# Patient Record
Sex: MALE | Race: WHITE | Employment: FULL TIME | URBAN - METROPOLITAN AREA
[De-identification: names, ages, dates, MRNs, and addresses within clinical notes are randomized per-mention and may not be internally consistent; named-entity substitution may affect disease eponyms.]

---

## 2020-01-16 ENCOUNTER — HOSPITAL ENCOUNTER (EMERGENCY)
Facility: CLINIC | Age: 40
Discharge: HOME | End: 2020-01-16
Attending: EMERGENCY MEDICINE | Admitting: EMERGENCY MEDICINE
Payer: COMMERCIAL

## 2020-01-16 ENCOUNTER — HOSPITAL ENCOUNTER (EMERGENCY)
Facility: CLINIC | Age: 40
Discharge: HOME | End: 2020-01-17
Attending: EMERGENCY MEDICINE | Admitting: EMERGENCY MEDICINE
Payer: COMMERCIAL

## 2020-01-16 VITALS
RESPIRATION RATE: 20 BRPM | SYSTOLIC BLOOD PRESSURE: 142 MMHG | TEMPERATURE: 96 F | DIASTOLIC BLOOD PRESSURE: 85 MMHG | BODY MASS INDEX: 30.39 KG/M2 | HEIGHT: 64 IN | HEART RATE: 80 BPM | OXYGEN SATURATION: 98 % | WEIGHT: 178 LBS

## 2020-01-16 VITALS
TEMPERATURE: 98.2 F | HEIGHT: 64 IN | BODY MASS INDEX: 30.39 KG/M2 | OXYGEN SATURATION: 99 % | WEIGHT: 178 LBS | DIASTOLIC BLOOD PRESSURE: 98 MMHG | SYSTOLIC BLOOD PRESSURE: 148 MMHG | RESPIRATION RATE: 20 BRPM

## 2020-01-16 DIAGNOSIS — R04.0 EPISTAXIS: ICD-10-CM

## 2020-01-16 DIAGNOSIS — F41.0 ANXIETY ATTACK: ICD-10-CM

## 2020-01-16 PROCEDURE — 30903 CONTROL OF NOSEBLEED: CPT | Mod: RT

## 2020-01-16 PROCEDURE — 25000128 H RX IP 250 OP 636: Performed by: EMERGENCY MEDICINE

## 2020-01-16 PROCEDURE — 27210182 ZZH KIT NASAL PACKING

## 2020-01-16 PROCEDURE — 99285 EMERGENCY DEPT VISIT HI MDM: CPT | Mod: 25,27

## 2020-01-16 PROCEDURE — 96372 THER/PROPH/DIAG INJ SC/IM: CPT

## 2020-01-16 PROCEDURE — 25000132 ZZH RX MED GY IP 250 OP 250 PS 637: Performed by: EMERGENCY MEDICINE

## 2020-01-16 PROCEDURE — 99284 EMERGENCY DEPT VISIT MOD MDM: CPT | Mod: 25

## 2020-01-16 RX ORDER — HYDROXYZINE HYDROCHLORIDE 25 MG/1
25 TABLET, FILM COATED ORAL ONCE
Status: COMPLETED | OUTPATIENT
Start: 2020-01-16 | End: 2020-01-16

## 2020-01-16 RX ORDER — LORAZEPAM 1 MG/1
1 TABLET ORAL ONCE
Status: COMPLETED | OUTPATIENT
Start: 2020-01-16 | End: 2020-01-16

## 2020-01-16 RX ORDER — TRANEXAMIC ACID 100 MG/ML
500 INJECTION, SOLUTION INTRAVENOUS ONCE
Status: DISCONTINUED | OUTPATIENT
Start: 2020-01-16 | End: 2020-01-16 | Stop reason: HOSPADM

## 2020-01-16 RX ORDER — ACETAMINOPHEN 500 MG
1000 TABLET ORAL ONCE
Status: COMPLETED | OUTPATIENT
Start: 2020-01-16 | End: 2020-01-16

## 2020-01-16 RX ORDER — LORAZEPAM 2 MG/ML
1 INJECTION INTRAMUSCULAR ONCE
Status: COMPLETED | OUTPATIENT
Start: 2020-01-16 | End: 2020-01-16

## 2020-01-16 RX ADMIN — ACETAMINOPHEN 1000 MG: 500 TABLET, FILM COATED ORAL at 23:42

## 2020-01-16 RX ADMIN — LORAZEPAM 1 MG: 1 TABLET ORAL at 22:48

## 2020-01-16 RX ADMIN — HYDROXYZINE HYDROCHLORIDE 25 MG: 25 TABLET, FILM COATED ORAL at 23:42

## 2020-01-16 RX ADMIN — LORAZEPAM 1 MG: 2 INJECTION INTRAMUSCULAR; INTRAVENOUS at 22:48

## 2020-01-16 ASSESSMENT — ENCOUNTER SYMPTOMS
RHINORRHEA: 0
COUGH: 0
NAUSEA: 1
VOMITING: 0
SORE THROAT: 0
NERVOUS/ANXIOUS: 1
LIGHT-HEADEDNESS: 1

## 2020-01-16 ASSESSMENT — MIFFLIN-ST. JEOR
SCORE: 1638.4
SCORE: 1638.4

## 2020-01-16 NOTE — ED AVS SNAPSHOT
Emergency Department  [address] [facility] [address]  Phone:  [phone]  Fax:  [phone]                                    David Villa   MRN: [id number]    Department:   Emergency Department   Date of Visit:  1/16/2020           After Visit Summary Signature Page    I have received my discharge instructions, and my questions have been answered. I have discussed any challenges I see with this plan with the nurse or doctor.    ..........................................................................................................................................  Patient/Patient Representative Signature      ..........................................................................................................................................  Patient Representative Print Name and Relationship to Patient    ..................................................               ................................................  Date                                   Time    ..........................................................................................................................................  Reviewed by Signature/Title    ...................................................              ..............................................  Date                                               Time          22EPIC Rev 08/18

## 2020-01-16 NOTE — ED AVS SNAPSHOT
Emergency Department  [address] [facility] [address]  Phone:  [phone]  Fax:  [phone]                                    David Villa   MRN: [id number]    Department:   Emergency Department   Date of Visit:  1/16/2020           After Visit Summary Signature Page    I have received my discharge instructions, and my questions have been answered. I have discussed any challenges I see with this plan with the nurse or doctor.    ..........................................................................................................................................  Patient/Patient Representative Signature      ..........................................................................................................................................  Patient Representative Print Name and Relationship to Patient    ..................................................               ................................................  Date                                   Time    ..........................................................................................................................................  Reviewed by Signature/Title    ...................................................              ..............................................  Date                                               Time          22EPIC Rev 08/18

## 2020-01-16 NOTE — ED PROVIDER NOTES
"  History     Chief Complaint:  Epistaxis    The history is provided by the patient.      David Villa is a 39 year old male with asthma who presents for evaluation of right sided epistaxis. This began about 90 minutes prior to arrival when he awoke from sleep. He attempted to stop the bleeding by packing with cotton balls and pressure. These were unsuccessful which prompted ER visit. He feels lightheaded with this epistaxis and nauseated which he attributes to swallowing blood. Otherwise, he has had no chest pain, dyspnea, or emesis. He has not recent had URI symptoms and denies trauma to the nose. He does not have a history of frequent epistaxis.     Allergies:  Prilosec    Prochlorperazine    Medications:    Ventolin  Benadryl  Claritin  Zofran  Senokot  Ultram  Prozac  Albuterol      Past Medical History:    Asthma  Allergic rhinitis  Depression  Dyslipidemia  Headaches  Renal disease  Nephrolithiasis     Past Surgical History:    [place] teeth extraction  Septoplasty   Subtalar arthroereisis   Hernia repair   Orthopedic surgery, toe    Family History:    Father - Hypertension, diabetes, MICHI, depression, alcohol abuse  Mother - Hypertension, bladder cancer, asthma     Social History:  The patient was unaccompanied to the ED.  Smoking Status: Former, 2010  Smokeless Tobacco: Never  Alcohol Use: No  Drug Use: No   Marital Status:  Single     Review of Systems   HENT: Positive for nosebleeds. Negative for congestion, rhinorrhea and sore throat.    Respiratory: Negative for cough and shortness of breath.    Cardiovascular: Negative for chest pain.   Gastrointestinal: Positive for nausea. Negative for vomiting.   Neurological: Positive for light-headedness.   All other systems reviewed and are negative.    Physical Exam     Patient Vitals for the past 24 hrs:   BP Temp Temp src Pulse Resp SpO2 Height Weight   01/16/20 0710 (!) 154/87 96  F (35.6  C) Temporal 78 20 96 % 1.626 m (5' 4\") 80.7 kg (178 lb)    "   Physical Exam  General: Well-developed and well-nourished. Well appearing young  man. Cooperative.  Head:  Atraumatic.  Eyes:  Conjunctivae, lids, and sclerae are normal.  ENT:    Moist mucous membranes.  No active bleeding down the posterior oropharynx appreciated though there is continuous oozing from the right nare.  No focal site of bleeding is identified with a dry, irritated Hesselbach's plexus.  Neck:  Supple. Normal range of motion.  Resp:  No respiratory distress.   GI:  Non-distended.    MS:  Normal ROM.   Skin:  Warm. Non-diaphoretic. No pallor.  Neuro:  Awake. A&Ox3. Normal strength.  Normal gait.  Psych: Normal mood and affect. Normal speech.  Vitals reviewed.    Emergency Department Course   Procedures      Rapid Rhino Nasal Packing     PHYSICIAN: Carla Francois MD    INDICATION:  Epistaxis    PREPARATION: Right nare was prepped with Afrin and TXA soaked cotton.     TECHNIQUE: A 5.5 cm Rapid Rhino was inserted into the right nostril to provide pressure. The patient had adequate hemostasis after application of packing, and the patient was generally comfortable after the procedure. The patient tolerated the procedure well with no immediate complications.      Emergency Department Course:  Nursing notes and vitals reviewed.    (0733)   I performed an exam of the patient as documented above. History obtained from patient.    (0742)   I placed a TXA soaked cotton ball in the right nare.     (0812)   I rechecked the patient.     (0812)   I performed the nasal packing procedure, as noted above.     (0831)   The patient passed a trial of ambulation without recurrence of bleeding.     (0836)   I rechecked the patient and discussed plan of care.     (0846)   I answered the patient's questions.     Findings and plan explained to the patient. Patient discharged home with instructions regarding supportive care, medications, and reasons to return. The importance of close follow-up was reviewed. The patient  was prescribed Augmentin. I personally answered all related questions prior to discharge.    Impression & Plan    Medical Decision Making:  David is a 39-year-old man who developed epistaxis about 90 minutes prior to arrival which he was unable to get to stop at home and prompted his presentation.  He does feel some blood running in his posterior oropharynx, which is upsetting his stomach and he is also feeling somewhat lightheaded, though he denies all other concerns.  He appears well on exam without active bleeding visualized in the posterior oropharynx, though there is active bleeding from the right nare.  The septal mucosa bilaterally looks dry and irritated.  The bleeding did not subside with direct pressure and Afrin.  Further, it did not subside with a TXA soaked cotton ball.  As such, patient required nasal packing with 5.5 cm rapid Rhino.  Fortunately, this resulted in cessation of his bleeding.  He was able to ambulate without recurrence of bleeding and is comfortable with plan for discharge.  He understands he should return should he have recurrence of bleeding that he cannot get to stop on his own or if he develops any new or concerning symptoms including, but not limited to, fever.  I will place him on Augmentin for prophylaxis with while the packing is in place, though the patient understands the need to call ENT to arrange follow-up.  We discussed use of humidifier, Vaseline, and saline nasal spray to prevent epistaxis in the future.  All of David's questions were answered and he verbalized understanding.  Amenable to discharge.    Diagnosis:    ICD-10-CM    1. Epistaxis R04.0       Disposition:   Discharge home.      Discharge Medications:     Medication List      Started    amoxicillin-clavulanate 875-125 MG tablet  Commonly known as:  AUGMENTIN  1 tablet, Oral, 2 TIMES DAILY          Scribe Disclosure:  Renan RICHARDSON, am serving as a scribe at 7:32 AM on 1/16/2020 to document services personally  performed by Carla Francois MD, based on my observations and the provider's statements to me.  1/16/2020    EMERGENCY DEPARTMENT       Carla Francois MD  01/18/20 0756

## 2020-01-16 NOTE — DISCHARGE INSTRUCTIONS
If bleeding returns and will not stop or you develop other symptoms including, but not limited to, fever, return to the emergency department.  Consider the use of a humidifier and Vaseline just inside the nostril.  You can also use saline nasal spray on the left side.  Follow-up with ENT in the next few days for packing removal.  While the packing is in, take antibiotics.  
Normal rate, regular rhythm.  Heart sounds S1, S2.  No murmurs, rubs or gallops.

## 2020-01-17 PROCEDURE — 25000128 H RX IP 250 OP 636: Performed by: EMERGENCY MEDICINE

## 2020-01-17 RX ORDER — ONDANSETRON 4 MG/1
4 TABLET, ORALLY DISINTEGRATING ORAL ONCE
Status: COMPLETED | OUTPATIENT
Start: 2020-01-17 | End: 2020-01-17

## 2020-01-17 RX ORDER — HYDROXYZINE HYDROCHLORIDE 25 MG/1
25 TABLET, FILM COATED ORAL EVERY 6 HOURS PRN
Qty: 10 TABLET | Refills: 0 | Status: SHIPPED | OUTPATIENT
Start: 2020-01-17

## 2020-01-17 RX ADMIN — ONDANSETRON 4 MG: 4 TABLET, ORALLY DISINTEGRATING ORAL at 00:37

## 2020-01-17 NOTE — ED NOTES
Pacing room.  He states sometimes he gets so anxious that he gets violent.  Father in room and states he gets scared because when the patient gets upset he starts throwing things around the room and hurts other people.  The patient stated he won't hurt health care staff while he is here.  Pressing his hand against forehead while pacing.

## 2020-01-17 NOTE — ED TRIAGE NOTES
Right nostril packed with rhino, and very anxious about this.  Called the MD and MD stated not to take his lorazepam at home and to go to ER to get sedated per patient report

## 2020-01-17 NOTE — ED PROVIDER NOTES
"  History     Chief Complaint:  Anxiety    HPI   David Villa is a 39 year old male, with a history of anxiety, who presents to the ED for evaluation of anxiety. The patient reports his anxiety is usually manageable. His anxiety worsened after developing epistaxis and having a rapid rhino packed this morning at [facility] ED. The patient notes he called ENT and was advised to not take his Ativan and visit the ED this evening. He becomes violent and throw objects when he becomes anxious. He has an appointment with ENT tomorrow to have the rapid rhino removed and possible cauterization. The patient denies any other symptoms/complaints.       Allergies:  Omeprazole      Medications:    Albuterol inhaler  Augmentin  Fluoxetine  Claritin    Past Medical History:    Left nephrolithiasis  Asthma   Depression   Anxiety    HLD    Past Surgical History:    Septoplasty w/ turbinoplasty   GI surgery   Revision septoplasty, nasal polypectomy, turbinate reduction, & left antrum cyst removal   Toe surgery     Family History:    History reviewed. No pertinent family history.     Social History:  Smoking status: Former smoker   Substance use: No  Alcohol use: No  Presents to ED with family    Marital Status:  Single [1]     Review of Systems   Psychiatric/Behavioral: The patient is nervous/anxious.    All other systems reviewed and are negative.    Physical Exam     Patient Vitals for the past 24 hrs:   BP Temp Temp src Heart Rate Resp SpO2 Height Weight   01/16/20 2222 (!) 148/98 98.2  F (36.8  C) Oral 96 20 99 % 1.626 m (5' 4\") 80.7 kg (178 lb)     Physical Exam  Vitals: reviewed by me  General: Pt seen pacing around the room, anxious appearing, pleasant, cooperative, and alert to conversation  Eyes: Tracking well, clear conjunctiva BL  ENT: MMM, midline trachea.   Lungs:   No tachypnea, no accessory muscle use. No respiratory distress.   CV: Rate as above, regular rhythm.    MSK: no peripheral edema or joint effusion.  No " evidence of trauma  Skin: No rash, normal turgor and temperature  Neuro: Clear speech and no facial droop.  Psych: Not RIS, no e/o AH/VH, denies suicidality or homicidality.  Quite anxious.    Emergency Department Course     Interventions:  2248: Ativan 1mg IM  2248: Ativan 1mg PO   2342: Hydroxyzine 25mg PO  2342: Tylenol 1,000mg PO  0037: Zofran-ODT 4mg PO    Emergency Department Course:  Past medical records, nursing notes, and vitals reviewed.    2232: I performed an exam of the patient as documented above.     Patient was provided medications as noted above.    2329: I rechecked the patient.     Findings and plan explained to the patient. Patient discharged home with instructions regarding supportive care, medications, and reasons to return. The importance of close follow-up was reviewed. The patient was prescribed Hydroxyzine.     I personally answered all related questions prior to discharge.     Impression & Plan      Medical Decision Making:  Mr. Villa is a very pleasant 39 year old male who presents to the emergency room with what appears to be an anxiety attack. He is pacing in the room, appears very tense, and all of his symptoms have been resolved with Ativan and Hydroxyzine. He states he is an anxious person at baseline, but what has really tipped him over lately is the rhino rocket placed into his right nare. He has bumped up his appointment, he is going to see the ENT doctor early this morning, roughly in about 10 hours from now. He has no desire to harm himself, does appear to be compensating now, has good family support by his father who is driving him home. Will plan for discharge home with very clear return ED precautions and supportive therapy for his anxiety.     Diagnosis:    ICD-10-CM   1. Anxiety attack F41.0     Disposition: Patient discharged to home with family      Discharge Medications:  New Prescriptions    HYDROXYZINE (ATARAX) 25 MG TABLET    Take 1 tablet (25 mg) by mouth every 6  hours as needed for itching     Violet Giles  1/16/2020    EMERGENCY DEPARTMENT    Scribe Disclosure:  I, Violet Giles, am serving as a scribe at 10:32 PM on 1/16/2020 to document services personally performed by Efrem Barnes MD based on my observations and the provider's statements to me.        Efrem Barnes MD  01/17/20 0521

## 2020-01-17 NOTE — ED NOTES
Patient asking why he has a red spot above right eye on forehead and wondering if it because of the rhino.   I told him it was because of his thumb pressed against his forehead.

## 2020-01-18 ASSESSMENT — ENCOUNTER SYMPTOMS: SHORTNESS OF BREATH: 0

## 2020-11-06 ENCOUNTER — HOSPITAL ENCOUNTER (EMERGENCY)
Facility: CLINIC | Age: 40
Discharge: HOME | End: 2020-11-07
Attending: EMERGENCY MEDICINE | Admitting: EMERGENCY MEDICINE
Payer: COMMERCIAL

## 2020-11-06 ENCOUNTER — NURSE TRIAGE (OUTPATIENT)
Dept: NURSING | Facility: CLINIC | Age: 40
End: 2020-11-06

## 2020-11-06 VITALS
RESPIRATION RATE: 20 BRPM | OXYGEN SATURATION: 96 % | HEART RATE: 95 BPM | TEMPERATURE: 97.5 F | BODY MASS INDEX: 29.19 KG/M2 | SYSTOLIC BLOOD PRESSURE: 138 MMHG | HEIGHT: 64 IN | WEIGHT: 171 LBS | DIASTOLIC BLOOD PRESSURE: 92 MMHG

## 2020-11-06 DIAGNOSIS — F41.9 ANXIETY: ICD-10-CM

## 2020-11-06 DIAGNOSIS — R00.2 PALPITATIONS: ICD-10-CM

## 2020-11-06 DIAGNOSIS — Z98.890 POST-OPERATIVE STATE: ICD-10-CM

## 2020-11-06 LAB — INTERPRETATION ECG - MUSE: NORMAL

## 2020-11-06 PROCEDURE — 250N000011 HC RX IP 250 OP 636: Performed by: EMERGENCY MEDICINE

## 2020-11-06 PROCEDURE — 250N000013 HC RX MED GY IP 250 OP 250 PS 637: Performed by: EMERGENCY MEDICINE

## 2020-11-06 PROCEDURE — 99285 EMERGENCY DEPT VISIT HI MDM: CPT | Mod: 25

## 2020-11-06 PROCEDURE — 96372 THER/PROPH/DIAG INJ SC/IM: CPT | Performed by: EMERGENCY MEDICINE

## 2020-11-06 PROCEDURE — 93005 ELECTROCARDIOGRAM TRACING: CPT

## 2020-11-06 RX ORDER — OLANZAPINE 10 MG/1
10 TABLET, ORALLY DISINTEGRATING ORAL AT BEDTIME
Status: DISCONTINUED | OUTPATIENT
Start: 2020-11-06 | End: 2020-11-07 | Stop reason: HOSPADM

## 2020-11-06 RX ORDER — LORAZEPAM 2 MG/ML
1 INJECTION INTRAMUSCULAR ONCE
Status: COMPLETED | OUTPATIENT
Start: 2020-11-06 | End: 2020-11-06

## 2020-11-06 RX ADMIN — OLANZAPINE 10 MG: 10 TABLET, ORALLY DISINTEGRATING ORAL at 23:38

## 2020-11-06 RX ADMIN — LORAZEPAM 1 MG: 2 INJECTION INTRAMUSCULAR; INTRAVENOUS at 23:01

## 2020-11-06 ASSESSMENT — ENCOUNTER SYMPTOMS: AGITATION: 1

## 2020-11-06 ASSESSMENT — MIFFLIN-ST. JEOR: SCORE: 1601.65

## 2020-11-06 NOTE — ED AVS SNAPSHOT
[facility] Emergency Dept  [address] [facility] [address]  Phone: [phone]  Fax: [phone]                                    David Villa   MRN: [id number]    Department: [facility] Emergency Dept   Date of Visit: 11/6/2020           After Visit Summary Signature Page    I have received my discharge instructions, and my questions have been answered. I have discussed any challenges I see with this plan with the nurse or doctor.    ..........................................................................................................................................  Patient/Patient Representative Signature      ..........................................................................................................................................  Patient Representative Print Name and Relationship to Patient    ..................................................               ................................................  Date                                   Time    ..........................................................................................................................................  Reviewed by Signature/Title    ...................................................              ..............................................  Date                                               Time          22EPIC Rev 08/18

## 2020-11-07 NOTE — ED NOTES
MD in with patient to discuss patients questions at discharge.  Patient instructed not to drive as he had sedating medications.  Patient verbalized understanding and states he did not drive himself here and his father will be driving him home.

## 2020-11-07 NOTE — ED PROVIDER NOTES
"History     Chief Complaint:  Agitation and Chest Pain       HPI  David Villa is a 40 year old year old male who presents for evaluation of agitation and chest pain. The patient presents from home after his dad brought him in for increased agitation. The patient had shoulder surgery today and states that his whole left arm is numb, and he is becoming agitated. He does have a history of agitation and subsequent hospitalization after surgery due to his behavior. He comes to the ED asking for something to calm him down before he panics further. He also notes chest pain that he rates 3/10.  Tried hydroxyzine prior to arrival.  Thought numbness in arm from regional block would resolve after ~12 hours but sensation hasn't changed yet.  Reports being blocked around 11am.      Allergies:  Prilosec       Medications:   Ventolin inhaler  Fluoxetine HCl  Hydroxyzine  Senna-docusate  Ultram      Medical History:   Asthma  Depressive disorder  Hyperlipidemia  Headaches  Renal disease, kidney stone      Surgical History:   [place] teeth extraction  Septoplasty  GI surgery  Optical tracking system endoscopic sinus surgery  Orthopedic surgery, toe  Septoplasty, turbinoplasty, combined      Family History:   Family history reviewed. No pertinent family history.      Social History:  Smoking Status: Former Smoker   Smokeless Tobacco: Never Used  Alcohol Use: Negative  Drug Use: Negative  Primary Care: [facility]       Review of Systems   Cardiovascular: Positive for chest pain.   Psychiatric/Behavioral: Positive for agitation.   All other systems reviewed and are negative.      Physical Exam     Patient Vitals for the past 24 hrs:   BP Temp Temp src Pulse Resp SpO2 Height Weight   11/06/20 2354 (!) 138/92 -- -- 95 -- -- -- --   11/06/20 2242 -- 97.5  F (36.4  C) Temporal -- -- -- -- --   11/06/20 2240 (!) 166/99 -- -- 100 20 96 % 1.626 m (5' 4\") 77.6 kg (171 lb)          Physical Exam    Resp:               Non-labored  CV:   "                RRR  Neuro:             Alert and cooperative, numbness LUE  MSkel:             Moving all extremities  Skin:                Bandage and cleaning solution LUE  Psych:             Standing, pacing    Emergency Department Course     ECG:  ECG taken at 2309, ECG read at 2319  Normal sinus rhythm  Left axis deviation (new vs. 8/29/12)  Abnormal ECG  Rate 93 bpm. ND interval 122 ms. QRS duration 92 ms. QT/QTc 352/437 ms. P-R-T axes 50 -49 37.      Procedures:        Interventions:   2301 Ativan 1 mg IM  2338 Zyprexa 10 mg PO      Emergency Department Course:    2241 Nursing notes and vitals reviewed.    2246 I performed an exam of the patient as documented above.     2302 Findings and plan explained to the Patient. Patient discharged home with instructions regarding supportive care, medications, and reasons to return. The importance of close follow-up was reviewed. The patient was prescribed as below.    2309 EKG obtained as noted above.    Impression & Plan   Medical Decision Making:    David Villa is a 40 year old male who presents for evaluation of anxiety associated with a post-operative state and persistent numbness in the left arm. I believe this is still within the normal timing for numbness.  After initial anxiety he had some chest discomfort versus palpitations. EKG was obtained showing left axis deviation and patient became concerned as the EKG read abnormal. I discussed this with him twice during his ED visit and reassured him that there were no concerning abnormalities on it. He was initially given ativan but remained wide awake afterwards so Zyprexa was tried as well. He has pain medications and hydroxyzine to take when he gets home. He also has lorazepam at home to help manage anxiety. We discussed spacing out these medications and which ones to take upon arrival home. He understands that he can return immediately for any worsening symptoms.      Diagnosis:     ICD-10-CM    1. Anxiety   F41.9    2. Palpitations  R00.2    3. Post-operative state  Z98.890         Disposition:  Discharged to home.    Discharge Medications:  New Prescriptions    No medications on file       Scribe Disclosure:  I, Kristine Dior, am serving as a scribe at 10:47 PM on 11/6/2020 to document services personally performed by Rebeca Sprague MD based on my observations and the provider's statements to me.      Rebeca Sprague MD  11/07/20 0157

## 2020-11-07 NOTE — TELEPHONE ENCOUNTER
David calls and says that he had left shoulder surgery today and he is very anxious. Pt. Says that he is wide awake and can become violent with his anxiety. Pt. Says that he just needs to get a medication for his anxiety. Pt. Says that he will have someone take him to an ER now. COVID 19 Nurse Triage Plan/Patient Instructions    Please be aware that novel coronavirus (COVID-19) may be circulating in the community. If you develop symptoms such as fever, cough, or SOB or if you have concerns about the presence of another infection including coronavirus (COVID-19), please contact your health care provider or visit [email or web address].     Disposition/Instructions    In-Person Visit with provider recommended. Reference Visit Selection Guide.    Thank you for taking steps to prevent the spread of this virus.  o Limit your contact with others.  o Wear a simple mask to cover your cough.  o Wash your hands well and often.    Resources    [facility]: About COVID-19: [email or web address]    CDC: What to Do If You're Sick: www.cdc.gov/coronavirus/2019-ncov/about/steps-when-sick.html    CDC: Ending Home Isolation: www.cdc.gov/coronavirus/2019-ncov/hcp/disposition-in-home-patients.html     CDC: Caring for Someone: www.cdc.gov/coronavirus/2019-ncov/if-you-are-sick/care-for-someone.html     [facility]: Interim Guidance for Hospital Discharge to Home: www.health.[facility].[place]/diseases/coronavirus/hcp/hospdischarge.pdf    [facility] clinical trials (COVID-19 research studies): [email or web address]/n-clinical-trials     Below are the COVID-19 hotlines at the [place] Department of [facility] ([facility]). Interpreters are available.   o For health questions: Call 193-850-7460 or 1-852.768.7531 (7 a.m. to 7 p.m.)  o For questions about schools and childcare: Call 592-012-5143 or 1-167.737.1893 (7 a.m. to 7 p.m.)      Additional Information    Negative: Severe difficulty breathing (e.g., struggling for each breath, speaks  in single words)    Negative: Bluish (or gray) lips or face now    Negative: Difficult to awaken or acting confused (e.g., disoriented, slurred speech)    Negative: Hysterical or combative behavior    Negative: Sounds like a life-threatening emergency to the triager    Negative: Chest pain    Negative: Palpitations, skipped heart beat, or rapid heart beat    Negative: Cough is main symptom    Negative: Suicide thoughts, threats, attempts, or questions    Negative: Depression is main problem or symptom (e.g., feelings of sadness or hopelessness)    Negative: [1] Difficulty breathing AND [2] persists > 10 minutes AND [3] not relieved by reassurance provided by triager    Negative: [1] Lightheadedness or dizziness AND [2] persists > 10 minutes AND [3] not relieved by reassurance provided by triager    Negative: [1] Alcohol or drug abuse, known or suspected AND [2] feeling very shaky (i.e., visible tremors of hands)    Negative: Patient sounds very sick or weak to the triager    Negative: Symptoms interfere with work or school    Negative: Requesting to talk to a counselor (e.g., mental health worker, psychiatrist)    Patient sounds very upset or troubled to the triager    Protocols used: ANXIETY AND PANIC ATTACK-A-AH